# Patient Record
Sex: MALE | Race: WHITE | NOT HISPANIC OR LATINO | URBAN - METROPOLITAN AREA
[De-identification: names, ages, dates, MRNs, and addresses within clinical notes are randomized per-mention and may not be internally consistent; named-entity substitution may affect disease eponyms.]

---
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
## 2023-03-06 ENCOUNTER — APPOINTMENT (OUTPATIENT)
Dept: ORTHOPEDIC SURGERY | Facility: CLINIC | Age: 18
End: 2023-03-06
Payer: COMMERCIAL

## 2023-03-06 VITALS — WEIGHT: 165 LBS | BODY MASS INDEX: 25.9 KG/M2 | HEIGHT: 67 IN

## 2023-03-06 DIAGNOSIS — M75.41 IMPINGEMENT SYNDROME OF RIGHT SHOULDER: ICD-10-CM

## 2023-03-06 DIAGNOSIS — M25.611 STIFFNESS OF RIGHT SHOULDER, NOT ELSEWHERE CLASSIFIED: ICD-10-CM

## 2023-03-06 DIAGNOSIS — Z78.9 OTHER SPECIFIED HEALTH STATUS: ICD-10-CM

## 2023-03-06 DIAGNOSIS — M25.511 PAIN IN RIGHT SHOULDER: ICD-10-CM

## 2023-03-06 PROBLEM — Z00.00 ENCOUNTER FOR PREVENTIVE HEALTH EXAMINATION: Status: ACTIVE | Noted: 2023-03-06

## 2023-03-06 PROCEDURE — 99204 OFFICE O/P NEW MOD 45 MIN: CPT

## 2023-03-06 PROCEDURE — 73030 X-RAY EXAM OF SHOULDER: CPT | Mod: RT

## 2023-03-07 PROBLEM — M75.41 INTERNAL IMPINGEMENT OF RIGHT SHOULDER: Status: ACTIVE | Noted: 2023-03-06

## 2023-03-07 PROBLEM — M25.611 GLENOHUMERAL INTERNAL ROTATION DEFICIT OF RIGHT SHOULDER: Status: ACTIVE | Noted: 2023-03-06

## 2023-03-07 NOTE — HISTORY OF PRESENT ILLNESS
[de-identified] : The patient is a 17 year  old right hand dominant male who presents today complaining of right shoulder pain.  \par Date of Injury/Onset: 11/1/22\par Pain:    At Rest: 0/10 \par With Activity:  6/10 \par Mechanism of injury: gradual onset, no STEWART\par This is NOT a Work Related Injury being treated under Worker's Compensation.\par This is NOT an athletic injury occurring associated with an interscholastic or organized sports team.\par Quality of symptoms: sharp\par Improves with: rest\par Worse with: throwing, shoulder abduction\par Prior treatment: none\par Prior Imaging: none\par Out of work/sport: no, since n/a\par School/Sport/Position/Occupation: student at [employer] - plays intramural football, softball\par Additional Information: None\par

## 2023-03-07 NOTE — IMAGING
[Right] : right shoulder [There are no fractures, subluxations or dislocations. No significant abnormalities are seen] : There are no fractures, subluxations or dislocations. No significant abnormalities are seen [No bony abnormalities] : No bony abnormalities [de-identified] : The patient is a well appearing 17 year  old male of their stated age. \par Neck is supple & nontender to palpation. Negative Spurling's test. \par \par Effected Shoulder: RIGHT \par Inspection: \par Scapula Winging: Negative \par Deformity: None \par Erythema: None \par Ecchymosis: None \par Abrasions: None \par Effusion: None \par \par Range of Motion: \par Active Forward Flexion: 180 degrees  \par Active Abduction: 180 degrees  \par Passive Forward Flexion: 180 degrees  \par Passive Abduction: 180 degrees  \par ER @ 90 degrees: 100 degrees \par IR @ 90 degrees: 0 degrees \par ER @ 0 degrees: 45 degrees \par Motor Exam: \par Forward Flexion: 5 out of 5 \par Flexion Plane of Scapula: 5 out of 5 \par Abduction: 5 out of 5 \par Internal Rotation: 5 out of 5 \par External Rotation: 5 out of 5 \par Distal Motor Strength: 5 out of 5 \par \par Stability Testing: \par Anterior: 1+ \par Posterior: 1+ \par Sulcus N: 1+ \par Sulcus ER: 1+ \par Provocative Tests: \par Drop Arm: Negative \par Impingement: Negative \par [place]: Negative \par X-Arm Adduction: Negative \par Belly Press: Negative \par Bear Hug: Negative \par Lift Off: Negative \par Apprehension: Negative \par Relocation: Negative \par Posterior Load & Shift: Negative \par \par Palpation: \par AC Joint: Nontender \par Clavicle: Nontender \par SC Joint: Nontender \par Bicepital Groove: Nontender \par Coracoid Process: Nontender \par Pectoralis Minor Tendon: TIGHT\par Pectoralis Major Tendon: TIGHT & palpably intact \par Latissimus Dorsi: Nontender  \par Proximal Humerus: Nontender \par Scapula Body: Nontender \par Medial Scapula Boarder: Nontender \par Scapula Spine: Nontender \par \par Neurologic Exam: Sensation to Light Touch: \par Axillary: Grossly intact \par Ulnar: Grossly intact \par Radial: Grossly intact \par Median: Grossly intact \par Other:  N/A \par Circulatory/Pulses: \par Ulnar: 2+ \par Radial: 2+ \par Other Pertinent Findings: None \par \par Contralateral Shoulder \par Range of Motion: \par Active Forward Flexion: 180 degrees  \par Active Abduction: 180 degrees  \par Passive Forward Flexion: 180 degrees \par Passive Abduction: 180 degrees  \par ER @ 90 degrees: 90 degrees \par IR @ 90 degrees: 45 degrees \par ER @ 0 degrees: 50 degrees \par Motor Exam: \par Forward Flexion: 5 out of 5 \par Flexion Plane of Scapula: 5 out of 5 \par Abduction: 5 out of 5 \par Internal Rotation: 5 out of 5 \par External Rotation: 5 out of 5 \par Distal Motor Strength: 5 out of 5 \par Stability Testing: \par Anterior: 1+ \par Posterior: 1+ \par Sulcus N: 1+ \par Sulcus ER: 1+ \par Other Pertinent Findings: None \par \par Assessment: The patient is a 17 year old male with right shoulder pain and radiographic and physical exam findings consistent with internal impingement and GIRD.   \par The patient’s condition is acute \par Documents/Results Reviewed Today: X-Ray right shoulder \par Tests/Studies Independently Interpreted Today: X-Ray right shoulder is unremarkable \par Pertinent findings include: 180/180/100/0/45, 5/5 throughout, pectoralis tightness\par Confounding medical conditions/concerns: None\par \par Plan: Patient will start physical therapy, HEP, and stretching. Discussed the importance of strengthening his back and working on posterior chain exercises. Discussed taking OTC antiinflammatories as needed - use as directed. Modify activity as discussed.\par Tests Ordered: \par Prescription Medications Ordered: Discussed use of OTC NSAIDs including but not limited to Aleve, Motrin, Tylenol Advil, etc.\par Braces/DME Ordered: None \par Activity/Work/Sports Status: None \par Additional Instructions: None\par Follow-Up: 6 weeks \par  \par The patient's current medication management of their orthopedic diagnosis was reviewed today:\par (1) We discussed a comprehensive treatment plan that included possible pharmaceutical management involving the use of prescription strength medications including but not limited to options such as oral Naprosyn 500mg BID, once daily Meloxicam 15 mg, or 500-650 mg Tylenol versus over the counter oral medications and topical prescription NSAID Pennsaid vs over the counter Voltaren gel.  Based on our extensive discussion, the patient declined prescription medication and will use over the counter Advil, Alleve, Voltaren Gel or Tylenol as directed.\par (2) There is a moderate risk of morbidity with further treatment, especially from use of prescription strength medications and possible side effects of these medications which include upset stomach with oral medications, skin reactions to topical medications and cardiac/renal issues with long term use.\par (3) I recommended that the patient follow-up with their medical physician to discuss any significant specific potential issues with long term medication use such as interactions with current medications or with exacerbation of underlying medical comorbidities.\par (4) The benefits and risks associated with use of injectable, oral or topical, prescription and over the counter anti-inflammatory medications were discussed with the patient. The patient voiced understanding of the risks including but not limited to bleeding, stroke, kidney dysfunction, heart disease, and were referred to the black box warning label for further information. \par \par Madhuri STEPHEN attest that this documentation has been prepared under the direction and in the presence of Provider Dr. Eric Barahona. \par \par The documentation recorded by the scribe accurately reflects the services I, Dr. Eric Barahona, personally performed and the decisions made by me.\par  [FreeTextEntry1] : X-Ray right shoulder is unremarkable

## 2024-10-29 NOTE — ED STATDOCS - NS ED ATTENDING STATEMENT MOD
Ambulatory Visit  Name: Helena Rueda      : 1956      MRN: [id number]  Encounter Provider: Jonatan Newsome MD  Encounter Date: 10/29/2024   Encounter department: [facility] PR[facility]    Assessment & Plan  Viral upper respiratory tract infection         Fever, unspecified fever cause         Mixed hyperlipidemia  Patient's cholesterol is actually doing better than what it was before.  She reports she is not taking the atorvastatin.  Check in 6 months.  Reevaluate.    Orders:    Cholesterol, total; Future    Comprehensive metabolic panel; Future    HDL cholesterol; Future    LDL cholesterol, direct; Future    Cholesterol, total    Comprehensive metabolic panel    HDL cholesterol    LDL cholesterol, direct    Pain of left hip  Occasionally has some symptoms, especially with laying down.  When she is up and walking around, does not seem to cause as many problems.  If there is worsening of her symptoms, would recommend that she obtain the MRI that was ordered before.         Lumbar disc disease  Some of her hip pain could be related to lumbar disc disease.  Currently, it does not bother her too much.  If she has increased symptoms or problems, obtain the MRI that was ordered previously.         Hypokalemia         Need for COVID-19 vaccine         Need for influenza vaccination              1. Viral upper respiratory tract infection  Comments:  Resolved after antibiotics.  No further follow-up needed.  2. Fever, unspecified fever cause  Comments:  Resolved after antibiotics.  No further follow-up needed.  Assessment & Plan:         3. Mixed hyperlipidemia  Assessment & Plan:  Patient's cholesterol is actually doing better than what it was before.  She reports she is not taking the atorvastatin.  Check in 6 months.  Reevaluate.    Orders:    Cholesterol, total; Future    Comprehensive metabolic panel; Future    HDL cholesterol; Future    LDL cholesterol, direct; Future    Cholesterol,  total    Comprehensive metabolic panel    HDL cholesterol    LDL cholesterol, direct    Orders:  -     Cholesterol, total; Future; Expected date: 04/29/2025  -     Comprehensive metabolic panel; Future; Expected date: 04/29/2025  -     HDL cholesterol; Future; Expected date: 04/29/2025  -     LDL cholesterol, direct; Future; Expected date: 04/29/2025  -     Cholesterol, total  -     Comprehensive metabolic panel  -     HDL cholesterol  -     LDL cholesterol, direct  4. Pain of left hip  Assessment & Plan:  Occasionally has some symptoms, especially with laying down.  When she is up and walking around, does not seem to cause as many problems.  If there is worsening of her symptoms, would recommend that she obtain the MRI that was ordered before.         5. Lumbar disc disease  Assessment & Plan:  Some of her hip pain could be related to lumbar disc disease.  Currently, it does not bother her too much.  If she has increased symptoms or problems, obtain the MRI that was ordered previously.         6. Hypokalemia  Comments:  Potassium level was low on her most recent blood work.  Recommend recheck.  Ordered previously.  7. Need for COVID-19 vaccine  Comments:  Recommend COVID vaccine per 24/25 season per CDC.  8. Need for influenza vaccination  Comments:  Recommend Flu vaccine.      Chief Complaint   Patient presents with    Follow-up     Patient in office for 6 months follow up.         History of Present Illness     Patient is here to follow-up on multiple issues.    Fever and cold resolved after antibiotics.  No current symptoms.              Review of Systems   Constitutional:  Negative for appetite change and fever.   Respiratory:  Negative for chest tightness and shortness of breath.    Cardiovascular:  Negative for chest pain, palpitations and leg swelling.   Gastrointestinal:  Negative for abdominal pain.   Genitourinary:  Negative for difficulty urinating.   Musculoskeletal:  Negative for arthralgias and  "myalgias.   Skin:  Negative for wound.   Neurological:  Negative for dizziness, light-headedness and headaches.   Psychiatric/Behavioral:  Negative for dysphoric mood and sleep disturbance. The patient is not nervous/anxious.            Objective     /68 (BP Location: Right arm, Patient Position: Sitting, Cuff Size: Adult)   Pulse 76   Temp 97.5 °F (36.4 °C) (Temporal)   Resp 14   Ht 5' 1\" (1.549 m)   Wt 56.2 kg (124 lb)   SpO2 98%   BMI 23.43 kg/m²     White count 10.13, hemoglobin 12.8, hematocrit 39.9, platelets 262.  Mono negative.  ASO negative.  Lyme titer negative.  Sodium 140, potassium 3.3, calcium 9.2.  Blood sugar 119.  Creatinine 0.75, GFR 81.  AST 19, ALT 25.  Total cholesterol 144, LDL 83, HDL 45, triglycerides 82.    Physical Exam    " I have personally performed a face to face diagnostic evaluation on this patient. I have reviewed the ACP note and agree with the history, exam and plan of care, except as noted.

## 2025-01-04 ENCOUNTER — NON-APPOINTMENT (OUTPATIENT)
Age: 20
End: 2025-01-04